# Patient Record
Sex: FEMALE | Race: BLACK OR AFRICAN AMERICAN | Employment: FULL TIME | URBAN - METROPOLITAN AREA
[De-identification: names, ages, dates, MRNs, and addresses within clinical notes are randomized per-mention and may not be internally consistent; named-entity substitution may affect disease eponyms.]

---

## 2022-02-22 ENCOUNTER — OFFICE VISIT (OUTPATIENT)
Dept: FAMILY MEDICINE CLINIC | Age: 32
End: 2022-02-22
Payer: MEDICAID

## 2022-02-22 VITALS
BODY MASS INDEX: 20.24 KG/M2 | DIASTOLIC BLOOD PRESSURE: 81 MMHG | HEIGHT: 62 IN | SYSTOLIC BLOOD PRESSURE: 110 MMHG | WEIGHT: 110 LBS | RESPIRATION RATE: 14 BRPM | HEART RATE: 78 BPM | TEMPERATURE: 98.1 F | OXYGEN SATURATION: 99 %

## 2022-02-22 DIAGNOSIS — R07.9 CHEST PAIN, UNSPECIFIED TYPE: ICD-10-CM

## 2022-02-22 DIAGNOSIS — Z76.89 ENCOUNTER TO ESTABLISH CARE: ICD-10-CM

## 2022-02-22 DIAGNOSIS — R55 SYNCOPE, UNSPECIFIED SYNCOPE TYPE: Primary | ICD-10-CM

## 2022-02-22 PROCEDURE — 99204 OFFICE O/P NEW MOD 45 MIN: CPT | Performed by: STUDENT IN AN ORGANIZED HEALTH CARE EDUCATION/TRAINING PROGRAM

## 2022-02-22 RX ORDER — SULFAMETHOXAZOLE AND TRIMETHOPRIM 800; 160 MG/1; MG/1
TABLET ORAL
COMMUNITY
Start: 2022-02-12 | End: 2022-02-23

## 2022-02-22 RX ORDER — LOSARTAN POTASSIUM 50 MG/1
TABLET ORAL
COMMUNITY
Start: 2021-11-29 | End: 2022-02-23

## 2022-02-22 NOTE — PROGRESS NOTES
Chief Complaint   Patient presents with   [facility] Establish Care     Visit Vitals  /81 (BP 1 Location: Left upper arm, BP Patient Position: Sitting)   Pulse 78   Temp 98.1 °F (36.7 °C) (Axillary)   Resp 14   Ht 5' 2\" (1.575 m)   Wt 110 lb (49.9 kg)   SpO2 99%   BMI 20.12 kg/m²     1. Have you been to the ER, urgent care clinic since your last visit? Hospitalized since your last visit? Yes When: 2/12/2022 [facility]    2. Have you seen or consulted any other health care providers outside of the [address] [place] [address] since your last visit? Include any pap smears or colon screening.  No

## 2022-02-22 NOTE — PROGRESS NOTES
Subjective:     Chief Complaint   Patient presents with    [facility]     HPI:  Evelyn Vences is a 36 y.o. female who presents to [facility] and discuss recent syncopal episode. Patient has had 2 syncopal episodes over the last year. Both times she went to the hospital and nothing was found on work-up. Most recent episode occurred about 2 weeks ago where she was at work. States vision turning black and passing out. Unsure how long. Denies any palpitations, SOB chest pain when the symptoms occurred. States she was walking it may have been hot at the time. She works at [employer] in [employer]. She has had multiple episodes of chest pain as well not associated with her syncopal episodes. Usually at night. She has history of heartburn as well but physic this pain is different. She was on losartan which she she was instructed to stop taking. Blood pressure today is well controlled. She was started on losartan at about 25years old and has lost 20 pounds since then. Denies any other significant medical history. There are no problems to display for this patient. History reviewed. No pertinent past medical history. History reviewed. No pertinent family history. reports that she has been smoking. She has been smoking about 0.25 packs per day. She has never used smokeless tobacco. She reports current alcohol use. She reports current drug use. Drug: Marijuana. Current Outpatient Medications on File Prior to Visit   Medication Sig Dispense Refill    losartan (COZAAR) 50 mg tablet TAKE 1 TABLET BY MOUTH IN THE MORNING (Patient not taking: Reported on 2/22/2022)      trimethoprim-sulfamethoxazole (BACTRIM DS, SEPTRA DS) 160-800 mg per tablet  (Patient not taking: Reported on 2/22/2022)       No current facility-administered medications on file prior to visit. No Known Allergies  Review of Systems   All other systems reviewed and are negative.       Objective:     Vitals: 02/22/22 0734   BP: 110/81   Pulse: 78   Resp: 14   Temp: 98.1 °F (36.7 °C)   TempSrc: Axillary   SpO2: 99%   Weight: 110 lb (49.9 kg)   Height: 5' 2\" (1.575 m)     Physical Exam  Vitals reviewed. Constitutional:       Appearance: Normal appearance. HENT:      Head: Normocephalic and atraumatic. Eyes:      Comments: Mild proptosis   Neck:      Thyroid: No thyromegaly. Cardiovascular:      Rate and Rhythm: Normal rate. Rhythm irregular. Heart sounds: Normal heart sounds. Pulmonary:      Effort: Pulmonary effort is normal.      Breath sounds: Normal breath sounds. Neurological:      General: No focal deficit present. Mental Status: She is alert and oriented to person, place, and time. Cranial Nerves: No cranial nerve deficit. Comments: 5/5 strength in upper and lower extremities   Psychiatric:         Behavior: Behavior normal.            Assessment/Plan:       ICD-10-CM ICD-9-CM    1. Syncope, unspecified syncope type  R55 780.2 TSH 3RD GENERATION      CBC WITH AUTOMATED DIFF      METABOLIC PANEL, COMPREHENSIVE      REFERRAL TO CARDIOLOGY   2. Chest pain, unspecified type  R07.9 786.50 REFERRAL TO CARDIOLOGY   3. Encounter to establish care  Z76.89 V65.8      Syncopeover the last year had to episodes of syncope. Work-up ventricles unremarkable. Suspect this is a vasovagal syncope in combination with hypertensive medication. Due to still having chest pain as well at different times during syncope I will have her see cardiology. Continue off losartan. Chest painsuspect this may be heartburn all due to episodes of syncope  Will have her see cardiology. Advised to try Pepcid. Encounter to establish carediscussed past medical history  Follow-up and Dispositions    · Return in about 3 months (around 5/22/2022) for Syncope.           Cornelius Shah MD

## 2022-02-23 LAB
ALBUMIN SERPL-MCNC: 4.1 G/DL (ref 3.9–5)
ALBUMIN/GLOB SERPL: 1.5 {RATIO} (ref 1.2–2.2)
ALP SERPL-CCNC: 49 IU/L (ref 44–121)
ALT SERPL-CCNC: 10 IU/L (ref 0–32)
AST SERPL-CCNC: 16 IU/L (ref 0–40)
BASOPHILS # BLD AUTO: 0 X10E3/UL (ref 0–0.2)
BASOPHILS NFR BLD AUTO: 1 %
BILIRUB SERPL-MCNC: <0.2 MG/DL (ref 0–1.2)
BUN SERPL-MCNC: 11 MG/DL (ref 6–20)
BUN/CREAT SERPL: 17 (ref 9–23)
CALCIUM SERPL-MCNC: 8.9 MG/DL (ref 8.7–10.2)
CHLORIDE SERPL-SCNC: 104 MMOL/L (ref 96–106)
CO2 SERPL-SCNC: 23 MMOL/L (ref 20–29)
CREAT SERPL-MCNC: 0.63 MG/DL (ref 0.57–1)
EOSINOPHIL # BLD AUTO: 0.2 X10E3/UL (ref 0–0.4)
EOSINOPHIL NFR BLD AUTO: 4 %
ERYTHROCYTE [DISTWIDTH] IN BLOOD BY AUTOMATED COUNT: 13.5 % (ref 11.7–15.4)
GLOBULIN SER CALC-MCNC: 2.7 G/DL (ref 1.5–4.5)
GLUCOSE SERPL-MCNC: 95 MG/DL (ref 65–99)
HCT VFR BLD AUTO: 37.9 % (ref 34–46.6)
HGB BLD-MCNC: 11.9 G/DL (ref 11.1–15.9)
IMM GRANULOCYTES # BLD AUTO: 0 X10E3/UL (ref 0–0.1)
IMM GRANULOCYTES NFR BLD AUTO: 0 %
LYMPHOCYTES # BLD AUTO: 1.3 X10E3/UL (ref 0.7–3.1)
LYMPHOCYTES NFR BLD AUTO: 34 %
MCH RBC QN AUTO: 27.9 PG (ref 26.6–33)
MCHC RBC AUTO-ENTMCNC: 31.4 G/DL (ref 31.5–35.7)
MCV RBC AUTO: 89 FL (ref 79–97)
MONOCYTES # BLD AUTO: 0.4 X10E3/UL (ref 0.1–0.9)
MONOCYTES NFR BLD AUTO: 10 %
NEUTROPHILS # BLD AUTO: 1.9 X10E3/UL (ref 1.4–7)
NEUTROPHILS NFR BLD AUTO: 51 %
PLATELET # BLD AUTO: 215 X10E3/UL (ref 150–450)
POTASSIUM SERPL-SCNC: 4.5 MMOL/L (ref 3.5–5.2)
PROT SERPL-MCNC: 6.8 G/DL (ref 6–8.5)
RBC # BLD AUTO: 4.26 X10E6/UL (ref 3.77–5.28)
SODIUM SERPL-SCNC: 138 MMOL/L (ref 134–144)
TSH SERPL DL<=0.005 MIU/L-ACNC: 1.52 UIU/ML (ref 0.45–4.5)
WBC # BLD AUTO: 3.8 X10E3/UL (ref 3.4–10.8)

## 2022-02-23 NOTE — PROGRESS NOTES
Please call patient and let her know her labs are essentially normal including normal liver, kidneys, electrolytes, thyroid. No anemia.

## 2022-04-13 ENCOUNTER — OFFICE VISIT (OUTPATIENT)
Dept: CARDIOLOGY CLINIC | Age: 32
End: 2022-04-13
Payer: MEDICAID

## 2022-04-13 VITALS
SYSTOLIC BLOOD PRESSURE: 122 MMHG | OXYGEN SATURATION: 99 % | DIASTOLIC BLOOD PRESSURE: 88 MMHG | HEART RATE: 86 BPM | BODY MASS INDEX: 20.24 KG/M2 | HEIGHT: 62 IN | WEIGHT: 110 LBS

## 2022-04-13 DIAGNOSIS — R55 SYNCOPE, UNSPECIFIED SYNCOPE TYPE: Primary | ICD-10-CM

## 2022-04-13 DIAGNOSIS — R07.9 CHEST PAIN AT REST: ICD-10-CM

## 2022-04-13 PROCEDURE — 99204 OFFICE O/P NEW MOD 45 MIN: CPT | Performed by: SPECIALIST

## 2022-04-13 PROCEDURE — 93000 ELECTROCARDIOGRAM COMPLETE: CPT | Performed by: SPECIALIST

## 2022-04-13 NOTE — PROGRESS NOTES
Callum Hernandez MD. [facility]              Patient: Laurence Broussard Younger  : 1990      Today's Date: 2022        HISTORY OF PRESENT ILLNESS:     History of Present Illness:  Referred for syncope and chest pain     Dr. Galvin Olszewski recently noted \"Patient has had 2 syncopal episodes over the last year. Both times she went to the hospital and nothing was found on work-up. Most recent episode occurred about 2 weeks ago where she was at work. States vision turning black and passing out. Unsure how long. Denies any palpitations, SOB chest pain when the symptoms occurred. States she was walking it may have been hot at the time. She works at [email or web address] in [employer]. She has had multiple episodes of chest pain as well not associated with her syncopal episodes. Usually at night. She has history of heartburn as well but physic this pain is different. She was on losartan which she she was instructed to stop taking. Blood pressure today is well controlled. She was started on losartan at about 25years old and has lost 20 pounds since then. \"    She says she passed out twice last year and once this year - went to [address] [place] [address] and was told it was low BP and losartan was stopped then (not admitted). She feels these spells coming on (gets lightheaded, vision goes black, etc) before spells. Has some chest tightness at night time (random, non-exertional). No heart racing. No CP related to syncope. Doing well off losartan. BP has been OK. PAST MEDICAL HISTORY:     Past Medical History:   Diagnosis Date    HTN (hypertension)     Syncope          MEDICATIONS:     On no meds       No Known Allergies          SOCIAL HISTORY:     Social History     Tobacco Use    Smoking status: Current Some Day Smoker     Packs/day: 0.25    Smokeless tobacco: Never Used   Substance Use Topics    Alcohol use:  Yes    Drug use: Yes     Types: Marijuana         FAMILY HISTORY:     Family History   Problem Relation Age of Onset  Stroke Mother              REVIEW OF SYMPTOMS:     Review of Symptoms:  Constitutional: Negative for fever, chills  HEENT: Negative for nosebleeds, tinnitus, and vision changes. Respiratory: Negative for cough, wheezing  Cardiovascular: Negative for orthopnea, claudication,  and PND. Gastrointestinal: Negative for abdominal pain, diarrhea, melena. Genitourinary: Negative for dysuria  Musculoskeletal: Negative for myalgias. Skin: Negative for rash  Heme: No problems bleeding. Neurological: Negative for speech change and focal weakness. PHYSICAL EXAM:     Physical Exam:  Visit Vitals  /82 (BP 1 Location: Left upper arm, BP Patient Position: Sitting, BP Cuff Size: Adult)   Pulse 83   Ht 5' 2\" (1.575 m)   Wt 110 lb (49.9 kg)   SpO2 99%   BMI 20.12 kg/m²     Patient appears generally well, mood and affect are appropriate and pleasant. HEENT:  Hearing intact, non-icteric, normocephalic, atraumatic. Neck Exam: Supple, No JVD or carotid bruits. Lung Exam: Clear to auscultation, even breath sounds. Cardiac Exam: Regular rate and rhythm with no murmur or rub  Abdomen: Soft, non-tender, normal bowel sounds. No bruits or masses. Extremities: Moves all ext well. No lower extremity edema. MSKTL: Overall good ROM ext  Skin: No significant rashes  Vascular: 2+ dorsalis pedis pulses bilaterally.   Psych: Appropriate affect  Neuro - Grossly intact      Vitals:    04/13/22 0934 04/13/22 1017 04/13/22 1018   BP: 136/82 122/60 122/88   BP 1 Location: Left upper arm Left upper arm Left upper arm   BP Patient Position: Sitting Supine Standing   BP Cuff Size: Adult Adult Adult   Pulse: 83 75 86   Height: 5' 2\" (1.575 m)     Weight: 110 lb (49.9 kg)     SpO2: 99%           LABS / OTHER STUDIES reviewed:     Lab Results   Component Value Date/Time    Sodium 138 02/22/2022 08:23 AM    Potassium 4.5 02/22/2022 08:23 AM    Chloride 104 02/22/2022 08:23 AM    CO2 23 02/22/2022 08:23 AM    Glucose 95 02/22/2022 08:23 AM    BUN 11 02/22/2022 08:23 AM    Creatinine 0.63 02/22/2022 08:23 AM    BUN/Creatinine ratio 17 02/22/2022 08:23 AM    GFR est  02/22/2022 08:23 AM    GFR est non- 02/22/2022 08:23 AM    Calcium 8.9 02/22/2022 08:23 AM    Bilirubin, total <0.2 02/22/2022 08:23 AM    Alk. phosphatase 49 02/22/2022 08:23 AM    Protein, total 6.8 02/22/2022 08:23 AM    Albumin 4.1 02/22/2022 08:23 AM    A-G Ratio 1.5 02/22/2022 08:23 AM    ALT (SGPT) 10 02/22/2022 08:23 AM    AST (SGOT) 16 02/22/2022 08:23 AM     Lab Results   Component Value Date/Time    WBC 3.8 02/22/2022 08:23 AM    HGB 11.9 02/22/2022 08:23 AM    HCT 37.9 02/22/2022 08:23 AM    PLATELET 618 76/26/7222 08:23 AM    MCV 89 02/22/2022 08:23 AM       Lab Results   Component Value Date/Time    TSH 1.520 02/22/2022 08:23 AM           CARDIAC DIAGNOSTICS:     Cardiac Evaluation Includes:  I reviewed the results below. EKG 4/13/22 - NSR, normal         ASSESSMENT AND PLAN:     Assessment and Plan:    1) Syncope   - had 3 syncopal spells [id number]  - symptoms likely vasovagal in nature and related to hypotension due to losartan (Of note, she was told this at last [facility] visit - will get those records to review)   - She has been doing fine since losartan was stopped   - She is not orthostatic on exam in the office   - Will check an echo to assess heart structure and function   - Will check a treadmill stress test given atypical CP  - Encouraged her to stay hydrated and lay down when she has warning signs of syncope     2) Atypical CP  - symptoms atypical for angina   - check a treadmill stress test     3) History of HTN  - BP OK off of losartan --- follow off of meds     4) Smoking cessation advised     5) Phone FU. See me as needed. She works at [employer]. Lives with aunt / uncle.           Marlyn Aguilar MD, [place] [address]  [address] [facility], [address]  [place] [address] [address], [address]  [place], [address] [place] [address]  Ph: [phone]   Ph [phone]      [facility]   4/19/2022  [facility] records reviewed - seen 2/7.22 for syncope. Labs, head CT, EKG normal - thought to have vasovagal syncope.  Had lowish BP and felt better after IV hydration   EKG 2/7/22 - NSR, normal       ADDENDUM   5/20/2022    Echo 5/19/22 - LVEF 66%, normal study     Treadmill Stress Test 5/19/22 - walked 10:40 (13.4 METS), normal study    Will send a message

## 2022-04-13 NOTE — PROGRESS NOTES
Orders for Echo and treadmill stress test when possible. See me as needed    per Dr. Gaby ELDER. Dx: syncope, cp    Req records from [facility] dx: syncope this year.     Vitals:    04/13/22 0934 04/13/22 1017 04/13/22 1018   BP: 136/82 122/60 122/88   BP 1 Location: Left upper arm Left upper arm Left upper arm   BP Patient Position: Sitting Supine Standing   BP Cuff Size: Adult Adult Adult   Pulse: 83 75 86   Height: 5' 2\" (1.575 m)     Weight: 110 lb (49.9 kg)     SpO2: 99%

## 2022-04-13 NOTE — PROGRESS NOTES
Juan Jaiems is a 36 y.o. female    Visit Vitals  /82 (BP 1 Location: Left upper arm, BP Patient Position: Sitting, BP Cuff Size: Adult)   Pulse 83   Ht 5' 2\" (1.575 m)   Wt 110 lb (49.9 kg)   SpO2 99%   BMI 20.12 kg/m²       Chief Complaint   Patient presents with    Chest Pain    Dizziness     SYNCOPE       Chest pain NO  SOB NO  Dizziness NO  Swelling NO  Recent hospital visit [facility], MARCH/22022, BRADYCARDIA  Refills NO  COVID VACCINE STATUS YES  HAD COVID?  NO

## 2022-05-19 ENCOUNTER — ANCILLARY PROCEDURE (OUTPATIENT)
Dept: CARDIOLOGY CLINIC | Age: 32
End: 2022-05-19
Payer: MEDICAID

## 2022-05-19 ENCOUNTER — ANCILLARY PROCEDURE (OUTPATIENT)
Dept: CARDIOLOGY CLINIC | Age: 32
End: 2022-05-19

## 2022-05-19 VITALS
HEIGHT: 62 IN | WEIGHT: 110 LBS | DIASTOLIC BLOOD PRESSURE: 78 MMHG | SYSTOLIC BLOOD PRESSURE: 124 MMHG | BODY MASS INDEX: 20.24 KG/M2

## 2022-05-19 VITALS — WEIGHT: 110 LBS | BODY MASS INDEX: 20.24 KG/M2 | HEIGHT: 62 IN

## 2022-05-19 DIAGNOSIS — R07.9 CHEST PAIN AT REST: ICD-10-CM

## 2022-05-19 DIAGNOSIS — R55 SYNCOPE, UNSPECIFIED SYNCOPE TYPE: ICD-10-CM

## 2022-05-19 LAB
ECHO AO ASC DIAM: 2.8 CM
ECHO AO ASCENDING AORTA INDEX: 1.89 CM/M2
ECHO AO ROOT DIAM: 2.8 CM
ECHO AO ROOT INDEX: 1.89 CM/M2
ECHO AV AREA PEAK VELOCITY: 2.3 CM2
ECHO AV AREA VTI: 2.3 CM2
ECHO AV AREA/BSA PEAK VELOCITY: 1.6 CM2/M2
ECHO AV AREA/BSA VTI: 1.6 CM2/M2
ECHO AV MEAN GRADIENT: 4 MMHG
ECHO AV MEAN VELOCITY: 0.9 M/S
ECHO AV PEAK GRADIENT: 6 MMHG
ECHO AV PEAK VELOCITY: 1.3 M/S
ECHO AV VELOCITY RATIO: 0.77
ECHO AV VTI: 24.2 CM
ECHO EST RA PRESSURE: 8 MMHG
ECHO LA DIAMETER INDEX: 1.69 CM/M2
ECHO LA DIAMETER: 2.5 CM
ECHO LA TO AORTIC ROOT RATIO: 0.89
ECHO LA VOL 2C: 36 ML (ref 22–52)
ECHO LA VOL 4C: 38 ML (ref 22–52)
ECHO LA VOLUME AREA LENGTH: 39 ML
ECHO LA VOLUME INDEX A2C: 24 ML/M2 (ref 16–34)
ECHO LA VOLUME INDEX A4C: 26 ML/M2 (ref 16–34)
ECHO LA VOLUME INDEX AREA LENGTH: 26 ML/M2 (ref 16–34)
ECHO LV E' LATERAL VELOCITY: 13 CM/S
ECHO LV E' SEPTAL VELOCITY: 9 CM/S
ECHO LV EDV A2C: 110 ML
ECHO LV EDV A4C: 103 ML
ECHO LV EDV BP: 107 ML (ref 56–104)
ECHO LV EDV INDEX A4C: 70 ML/M2
ECHO LV EDV INDEX BP: 72 ML/M2
ECHO LV EDV NDEX A2C: 74 ML/M2
ECHO LV EJECTION FRACTION A2C: 71 %
ECHO LV EJECTION FRACTION A4C: 59 %
ECHO LV EJECTION FRACTION BIPLANE: 66 % (ref 55–100)
ECHO LV ESV A2C: 32 ML
ECHO LV ESV A4C: 42 ML
ECHO LV ESV BP: 37 ML (ref 19–49)
ECHO LV ESV INDEX A2C: 22 ML/M2
ECHO LV ESV INDEX A4C: 28 ML/M2
ECHO LV ESV INDEX BP: 25 ML/M2
ECHO LV FRACTIONAL SHORTENING: 32 % (ref 28–44)
ECHO LV INTERNAL DIMENSION DIASTOLE INDEX: 2.97 CM/M2
ECHO LV INTERNAL DIMENSION DIASTOLIC: 4.4 CM (ref 3.9–5.3)
ECHO LV INTERNAL DIMENSION SYSTOLIC INDEX: 2.03 CM/M2
ECHO LV INTERNAL DIMENSION SYSTOLIC: 3 CM
ECHO LV IVSD: 0.6 CM (ref 0.6–0.9)
ECHO LV MASS 2D: 75.8 G (ref 67–162)
ECHO LV MASS INDEX 2D: 51.2 G/M2 (ref 43–95)
ECHO LV POSTERIOR WALL DIASTOLIC: 0.6 CM (ref 0.6–0.9)
ECHO LV RELATIVE WALL THICKNESS RATIO: 0.27
ECHO LVOT AREA: 2.8 CM2
ECHO LVOT AV VTI INDEX: 0.83
ECHO LVOT DIAM: 1.9 CM
ECHO LVOT MEAN GRADIENT: 2 MMHG
ECHO LVOT PEAK GRADIENT: 4 MMHG
ECHO LVOT PEAK VELOCITY: 1 M/S
ECHO LVOT STROKE VOLUME INDEX: 38.7 ML/M2
ECHO LVOT SV: 57.2 ML
ECHO LVOT VTI: 20.2 CM
ECHO MV A VELOCITY: 0.67 M/S
ECHO MV AREA PHT: 3.9 CM2
ECHO MV AREA VTI: 3 CM2
ECHO MV E DECELERATION TIME (DT): 193.5 MS
ECHO MV E VELOCITY: 0.77 M/S
ECHO MV E/A RATIO: 1.15
ECHO MV E/E' LATERAL: 5.92
ECHO MV E/E' RATIO (AVERAGED): 7.24
ECHO MV E/E' SEPTAL: 8.56
ECHO MV LVOT VTI INDEX: 0.95
ECHO MV MAX VELOCITY: 0.8 M/S
ECHO MV MEAN GRADIENT: 1 MMHG
ECHO MV MEAN VELOCITY: 0.5 M/S
ECHO MV PEAK GRADIENT: 2 MMHG
ECHO MV PRESSURE HALF TIME (PHT): 56.1 MS
ECHO MV VTI: 19.2 CM
ECHO RIGHT VENTRICULAR SYSTOLIC PRESSURE (RVSP): 24 MMHG
ECHO RV FREE WALL PEAK S': 10 CM/S
ECHO RV INTERNAL DIMENSION: 3.5 CM
ECHO RV TAPSE: 1.8 CM (ref 1.7–?)
ECHO TV REGURGITANT MAX VELOCITY: 2.01 M/S
ECHO TV REGURGITANT PEAK GRADIENT: 16 MMHG
STRESS ANGINA INDEX: 0
STRESS BASELINE DIAS BP: 62 MMHG
STRESS BASELINE HR: 72 BPM
STRESS BASELINE SYS BP: 118 MMHG
STRESS ESTIMATED WORKLOAD: 13.4 METS
STRESS EXERCISE DUR MIN: 10 MIN
STRESS EXERCISE DUR SEC: 40 SEC
STRESS O2 SAT PEAK: 99 %
STRESS O2 SAT REST: 97 %
STRESS PEAK DIAS BP: 82 MMHG
STRESS PEAK SYS BP: 174 MMHG
STRESS PERCENT HR ACHIEVED: 91 %
STRESS POST PEAK HR: 173 BPM
STRESS RATE PRESSURE PRODUCT: NORMAL BPM*MMHG
STRESS SR DUKE TREADMILL SCORE: 11
STRESS ST DEPRESSION: 0 MM
STRESS TARGET HR: 190 BPM

## 2022-05-19 PROCEDURE — 93306 TTE W/DOPPLER COMPLETE: CPT | Performed by: SPECIALIST

## 2022-05-19 PROCEDURE — 93015 CV STRESS TEST SUPVJ I&R: CPT | Performed by: SPECIALIST

## 2022-05-20 ENCOUNTER — TELEPHONE (OUTPATIENT)
Dept: CARDIOLOGY CLINIC | Age: 32
End: 2022-05-20

## 2022-05-23 ENCOUNTER — OFFICE VISIT (OUTPATIENT)
Dept: FAMILY MEDICINE CLINIC | Age: 32
End: 2022-05-23
Payer: MEDICAID

## 2022-05-23 VITALS
DIASTOLIC BLOOD PRESSURE: 93 MMHG | HEIGHT: 62 IN | BODY MASS INDEX: 19.14 KG/M2 | WEIGHT: 104 LBS | SYSTOLIC BLOOD PRESSURE: 132 MMHG | TEMPERATURE: 97.3 F | OXYGEN SATURATION: 99 % | RESPIRATION RATE: 14 BRPM | HEART RATE: 88 BPM

## 2022-05-23 DIAGNOSIS — I10 PRIMARY HYPERTENSION: Primary | ICD-10-CM

## 2022-05-23 DIAGNOSIS — R55 VASOVAGAL SYNCOPE: ICD-10-CM

## 2022-05-23 PROCEDURE — 99214 OFFICE O/P EST MOD 30 MIN: CPT | Performed by: STUDENT IN AN ORGANIZED HEALTH CARE EDUCATION/TRAINING PROGRAM

## 2022-05-23 RX ORDER — AMLODIPINE BESYLATE 5 MG/1
5 TABLET ORAL DAILY
Qty: 30 TABLET | Refills: 1 | Status: SHIPPED | OUTPATIENT
Start: 2022-05-23 | End: 2022-07-08 | Stop reason: SDUPTHER

## 2022-05-23 NOTE — PATIENT INSTRUCTIONS
Vasovagal Syncope: Care Instructions  Your Care Instructions     Vasovagal syncope (say \"zlz-iwz-WYCKavin SCOTT-kuh-pee\")is sudden dizziness or fainting that can be set off by things such as pain, stress, fear, or trauma. You may sweat or feel lightheaded, sick to your stomach, or tingly. The problem causes the heart rate to slow and the blood vessels to widen, or dilate, for a short time. When this happens, blood pools in the lower body, and less blood goes to the brain. You can usually get relief by lying down with your legs raised (elevated). This helps more blood to flow to your brain and may help relieve symptoms like feeling dizzy. Some doctors may recommend a technique that involves tensing your fists and arms. This type of fainting is often easy to predict. For example, it happens to some people when they see blood or have to get a shot. They may feel symptoms before they faint. An episode of vasovagal syncope usually responds well to self-care. Other treatment often isn't needed. But if the fainting keeps happening, your doctor may suggest further treatments. Follow-up care is a key part of your treatment and safety. Be sure to make and go to all appointments, and call your doctor if you are having problems. It's also a good idea to know your test results and keep a list of the medicines you take. How can you care for yourself at home? · Drink plenty of fluids to prevent dehydration. If you have kidney, heart, or liver disease and have to limit fluids, talk with your doctor before you increase your fluid intake. · Try to avoid things that you think may set off vasovagal syncope. · Talk to your doctor about any medicines you take. Some medicines may increase the chance of this condition occurring. · If you feel symptoms, lie down with your legs raised. Talk to your doctor about what to do if your symptoms come back. When should you call for help?    Call 911 anytime you think you may need emergency care. For example, call if:    · You have symptoms of a heart problem. These may include:  ? Chest pain or pressure. ? Severe trouble breathing. ? A fast or irregular heartbeat. Watch closely for changes in your health, and be sure to contact your doctor if:    · You have more episodes of fainting at home.     · You do not get better as expected. Where can you learn more? Go to http://www.gray.com/  Enter L754 in the search box to learn more about \"Vasovagal Syncope: Care Instructions. \"  Current as of: July 1, 2021               Content Version: 13.2  © 2006-2022 [employer]. Care instructions adapted under license by [employer] (which disclaims liability or warranty for this information). If you have questions about a medical condition or this instruction, always ask your healthcare professional. [address] any warranty or liability for your use of this information. Amlodipine (By mouth)   Amlodipine (di-FCV-qf-peen)  Treats high blood pressure and angina (chest pain). This medicine is a calcium channel blocker. Brand Name(s): Norvasc   There may be other brand names for this medicine. When This Medicine Should Not Be Used: This medicine is not right for everyone. Do not use it if you had an allergic reaction to amlodipine. How to Use This Medicine:   Tablet, Dissolving Tablet  · Take your medicine as directed. Your dose may need to be changed several times to find what works best for you. Take this medicine at the same time each day. · Read and follow the patient instructions that come with this medicine. Talk to your doctor or pharmacist if you have any questions. · Missed dose: Take a dose as soon as you remember. If it has been more than 12 hours since you were supposed to take your dose, skip the missed dose and take your next regular dose at the regular time.   · Store the medicine in a closed container at room temperature, away from heat, moisture, and direct light. Drugs and Foods to Avoid:   Ask your doctor or pharmacist before using any other medicine, including over-the-counter medicines, vitamins, and herbal products. · Some medicines can affect how amlodipine works. Tell your doctor if you are also using any of the following:   ¨ Clarithromycin, cyclosporine, diltiazem, itraconazole, ritonavir, sildenafil, simvastatin, tacrolimus  Warnings While Using This Medicine:   · Tell your doctor if you are pregnant or breastfeeding, or if you have liver disease, heart disease, coronary artery disease, or aortic stenosis. · This medicine could lower your blood pressure too much, especially when you first use it or if you are dehydrated. Stand or sit up slowly if you feel lightheaded or dizzy. · Your doctor will check your progress and the effects of this medicine at regular visits. Keep all appointments. · Do not stop using this medicine without asking your doctor, even if you feel well. This medicine will not cure high blood pressure, but it will help keep it in normal range. You may have to take blood pressure medicine for the rest of your life. · Keep all medicine out of the reach of children. Never share your medicine with anyone. Possible Side Effects While Using This Medicine:   Call your doctor right away if you notice any of these side effects:  · Allergic reaction: Itching or hives, swelling in your face or hands, swelling or tingling in your mouth or throat, chest tightness, trouble breathing  · Lightheadedness, dizziness  · New or worsening chest pain  · Swelling in your hands, ankles, or legs  · Trouble breathing, nausea, unusual sweating, fainting  If you notice other side effects that you think are caused by this medicine, tell your doctor. Call your doctor for medical advice about side effects.  You may report side effects to FDA at 2-328-FDA-2555  © 2017 [facility] Information is for End User's use only and may not be sold, redistributed or otherwise used for commercial purposes. The above information is an  only. It is not intended as medical advice for individual conditions or treatments. Talk to your doctor, nurse or pharmacist before following any medical regimen to see if it is safe and effective for you.

## 2022-05-23 NOTE — PROGRESS NOTES
Subjective:     Chief Complaint   Patient presents with    Follow-up     dizziness     HPI:  Brenda Lunsford is a 29 y.o. female who presents for follow-up of dizziness and syncope. Patient has had about 3 syncopal episodes in the last couple months. She was seen in a year and it was thought to have been due to vasovagal syncope and losartan use. Cardiology was consulted and cardiac studies were negative including normal stress test and echo. Since stopping losartan her symptoms have improved with just occasional dizziness, and no syncopal episodes. Her blood pressure has been elevated at home with systolic blood pressures in the 174T and diastolic blood pressures up to the 111. She checks her blood pressure every day and is usually elevated. His family history of hypertension as well. There are no problems to display for this patient. Past Medical History:   Diagnosis Date    HTN (hypertension)     Syncope      Family History   Problem Relation Age of Onset    Stroke Mother       reports that she has been smoking. She has been smoking about 0.25 packs per day. She has never used smokeless tobacco. She reports current alcohol use. She reports current drug use. Drug: Marijuana. No current outpatient medications on file prior to visit. No current facility-administered medications on file prior to visit. No Known Allergies  Review of Systems   All other systems reviewed and are negative. Objective:     Vitals:    05/23/22 0750   BP: (!) 132/93   Pulse: 88   Resp: 14   Temp: 97.3 °F (36.3 °C)   TempSrc: Axillary   SpO2: 99%   Weight: 104 lb (47.2 kg)   Height: 5' 2\" (1.575 m)     Physical Exam  Vitals reviewed. Constitutional:       Appearance: Normal appearance. HENT:      Head: Normocephalic and atraumatic. Cardiovascular:      Rate and Rhythm: Normal rate and regular rhythm. Heart sounds: Normal heart sounds.    Pulmonary:      Effort: Pulmonary effort is normal. Breath sounds: Normal breath sounds. Neurological:      Mental Status: She is alert and oriented to person, place, and time. Psychiatric:         Behavior: Behavior normal.            Assessment/Plan:       ICD-10-CM ICD-9-CM    1. Primary hypertension  I10 401.9 amLODIPine (NORVASC) 5 mg tablet   2. Vasovagal syncope  R55 780.2      Hypertension BP elevated  after being off of losartan. Due to worsening of vasovagal syncope, will try amlodipine. Amlodipine ordered follow-up in 6 weeks. Discussed possible side effects of amlodipine and to call if develops any. Vasovagal syncope as syncopal episodes are most likely vasovagal, possibly secondary to heat. Cardiac work-up was negative. Educated patient on conservative measures and provided her with documentation. Follow-up and Dispositions    · Return in about 6 weeks (around 7/4/2022) for HTN.             Lili Clark MD

## 2022-05-23 NOTE — PROGRESS NOTES
Chief Complaint   Patient presents with    Follow-up     Visit Vitals  BP (!) 132/93 (BP 1 Location: Left upper arm, BP Patient Position: Sitting)   Pulse 88   Temp 97.3 °F (36.3 °C) (Axillary)   Resp 14   Ht 5' 2\" (1.575 m)   Wt 104 lb (47.2 kg)   SpO2 99%   BMI 19.02 kg/m²     1. Have you been to the ER, urgent care clinic since your last visit? Hospitalized since your last visit? No    2. Have you seen or consulted any other health care providers outside of the [address] [place] [address] since your last visit? Include any pap smears or colon screening.  No

## 2022-05-24 NOTE — TELEPHONE ENCOUNTER
Spoke to pt,  Per Dr. Floydene Harada: [facility]   5/20/2022     Echo 5/19/22 - LVEF 66%, normal study      Treadmill Stress Test 5/19/22 - walked 10:40 (13.4 METS), normal study     Will send a message\"    Confirmed NOV is only PRN.

## 2022-07-07 ENCOUNTER — OFFICE VISIT (OUTPATIENT)
Dept: FAMILY MEDICINE CLINIC | Age: 32
End: 2022-07-07
Payer: MEDICAID

## 2022-07-07 VITALS
RESPIRATION RATE: 14 BRPM | OXYGEN SATURATION: 99 % | SYSTOLIC BLOOD PRESSURE: 105 MMHG | HEIGHT: 62 IN | BODY MASS INDEX: 18.95 KG/M2 | WEIGHT: 103 LBS | DIASTOLIC BLOOD PRESSURE: 84 MMHG | TEMPERATURE: 97.3 F | HEART RATE: 68 BPM

## 2022-07-07 DIAGNOSIS — I10 PRIMARY HYPERTENSION: Primary | ICD-10-CM

## 2022-07-07 DIAGNOSIS — R55 VASOVAGAL SYNCOPE: ICD-10-CM

## 2022-07-07 PROCEDURE — 99213 OFFICE O/P EST LOW 20 MIN: CPT | Performed by: STUDENT IN AN ORGANIZED HEALTH CARE EDUCATION/TRAINING PROGRAM

## 2022-07-07 NOTE — PROGRESS NOTES
Chief Complaint   Patient presents with    Hypertension     Visit Vitals  /84 (BP 1 Location: Left upper arm, BP Patient Position: Sitting)   Pulse 68   Temp 97.3 °F (36.3 °C) (Axillary)   Resp 14   Ht 5' 2\" (1.575 m)   Wt 103 lb (46.7 kg)   SpO2 99%   BMI 18.84 kg/m²     1. Have you been to the ER, urgent care clinic since your last visit? Hospitalized since your last visit? No    2. Have you seen or consulted any other health care providers outside of the [address] [place] [address] since your last visit? Include any pap smears or colon screening.  No

## 2022-07-07 NOTE — PROGRESS NOTES
Subjective:     Chief Complaint   Patient presents with    Hypertension     HPI:  Jatinder Haas is a 29 y.o. adult presents for follow-up of hypertension. Last visit patient had complained of elevated blood pressure at home after having her ARB stopped due to syncopal episodes. She did see cardiology and when she was diagnosed with vasovagal syncope, and cardiac causes were dismissed. She was started amlodipine for blood pressure. Her blood pressures been well controlled, with some the results of today in the office, as well as she denies any more syncopal or presyncopal episodes. Patient Active Problem List    Diagnosis    Primary hypertension    Vasovagal syncope     Past Medical History:   Diagnosis Date    HTN (hypertension)     Syncope      Family History   Problem Relation Age of Onset    Stroke Mother       reports that [address] \"Donn\" has been smoking. [address] Alvira Urvashi" has been smoking about 0.25 packs per day. Jatinder Haas \"Donn\" has never used smokeless tobacco. [address] \"Donn\" reports current alcohol use. [address] Alvira Urvashi" reports current drug use. Drug: Marijuana. Current Outpatient Medications on File Prior to Visit   Medication Sig Dispense Refill    amLODIPine (NORVASC) 5 mg tablet Take 1 Tablet by mouth daily. 30 Tablet 1     No current facility-administered medications on file prior to visit. No Known Allergies  Review of Systems   All other systems reviewed and are negative. Objective:     Vitals:    07/07/22 0913   BP: 105/84   Pulse: 68   Resp: 14   Temp: 97.3 °F (36.3 °C)   TempSrc: Axillary   SpO2: 99%   Weight: 103 lb (46.7 kg)   Height: 5' 2\" (1.575 m)     Physical Exam  Vitals reviewed. HENT:      Head: Normocephalic and atraumatic. Cardiovascular:      Rate and Rhythm: Normal rate and regular rhythm. Heart sounds: Normal heart sounds.    Pulmonary:      Effort: Pulmonary effort is normal.      Breath sounds: Normal breath sounds. Neurological:      Mental Status: Vicie Champion Jannette Goltz" is oriented to person, place, and time. Psychiatric:         Behavior: Behavior normal.            Assessment/Plan:       ICD-10-CM ICD-9-CM    1. Primary hypertension  I10 401.9    2. Vasovagal syncope  R55 780.2      Blood pressure well controlled now on amlodipine, and has not had any more vasovagal syncope since stopping ARB. Continue current management for now. Advised to monitor blood pressure closely as it is in the low normal range. If it continues to decrease I will decrease amlodipine to 2.5 mg daily. Follow-up and Dispositions    · Return in about 6 months (around 1/7/2023) for HTN.           Natan Jacques MD

## 2023-03-03 ENCOUNTER — OFFICE VISIT (OUTPATIENT)
Dept: FAMILY MEDICINE CLINIC | Age: 33
End: 2023-03-03
Payer: MEDICAID

## 2023-03-03 VITALS
DIASTOLIC BLOOD PRESSURE: 62 MMHG | BODY MASS INDEX: 18.95 KG/M2 | HEIGHT: 62 IN | SYSTOLIC BLOOD PRESSURE: 110 MMHG | HEART RATE: 100 BPM | OXYGEN SATURATION: 100 % | TEMPERATURE: 97.3 F | WEIGHT: 103 LBS

## 2023-03-03 DIAGNOSIS — I10 PRIMARY HYPERTENSION: Primary | ICD-10-CM

## 2023-03-03 PROCEDURE — 3074F SYST BP LT 130 MM HG: CPT | Performed by: STUDENT IN AN ORGANIZED HEALTH CARE EDUCATION/TRAINING PROGRAM

## 2023-03-03 PROCEDURE — 3078F DIAST BP <80 MM HG: CPT | Performed by: STUDENT IN AN ORGANIZED HEALTH CARE EDUCATION/TRAINING PROGRAM

## 2023-03-03 PROCEDURE — 99213 OFFICE O/P EST LOW 20 MIN: CPT | Performed by: STUDENT IN AN ORGANIZED HEALTH CARE EDUCATION/TRAINING PROGRAM

## 2023-03-03 NOTE — PROGRESS NOTES
Chief Complaint   Patient presents with    Follow Up Chronic Condition     1. \"Have you been to the ER, urgent care clinic since your last visit? Hospitalized since your last visit? \" No    2. \"Have you seen or consulted any other health care providers outside of the [address] [place] [address] since your last visit? \" No     3. For patients aged 39-70: Has the patient had a colonoscopy / FIT/ Cologuard? NA - based on age      If the patient is female:    4. For patients aged 41-77: Has the patient had a mammogram within the past 2 years? NA - based on age or sex      11. For patients aged 21-65: Has the patient had a pap smear?  No    3 most recent PHQ Screens 3/3/2023   Little interest or pleasure in doing things Not at all   Feeling down, depressed, irritable, or hopeless Not at all   Total Score PHQ 2 0   Trouble falling or staying asleep, or sleeping too much -   Feeling tired or having little energy -   Poor appetite, weight loss, or overeating -   Feeling bad about yourself - or that you are a failure or have let yourself or your family down -   Trouble concentrating on things such as school, work, reading, or watching TV -   Moving or speaking so slowly that other people could have noticed; or the opposite being so fidgety that others notice -   Thoughts of being better off dead, or hurting yourself in some way -   PHQ 9 Score -   How difficult have these problems made it for you to do your work, take care of your home and get along with others -

## 2023-03-03 NOTE — PROGRESS NOTES
Subjective:     Chief Complaint   Patient presents with    Follow Up Chronic Condition     HPI:  Adama Zavaleta is a 29 y.o. adult who is here for follow-up of hypertension. Blood pressure well controlled amlodipine. Had been switched from ARB stopped due to syncopal episodes. She did see cardiology and when she was diagnosed with vasovagal syncope, and cardiac causes were dismissed. She was started amlodipine for blood pressure. Her blood pressures been well controlled, with some the results of today in the office, as well as she denies any more syncopal or presyncopal episodes. Has never had Pap smear done before. Overdue for pap smear. Patient Active Problem List    Diagnosis    Primary hypertension    Vasovagal syncope     Past Medical History:   Diagnosis Date    HTN (hypertension)     Syncope      Family History   Problem Relation Age of Onset    Stroke Mother       reports that [address] Ady Perezs" has been smoking cigarettes. [address] Younger \"Donn\" has a 0.50 pack-year smoking history. Adama Zavaleta \"Donn\" has never used smokeless tobacco. [address] \"Donn\" reports current alcohol use. [address] Rollins Balta" reports current drug use. Drug: Marijuana. Current Outpatient Medications on File Prior to Visit   Medication Sig Dispense Refill    amLODIPine (NORVASC) 5 mg tablet Take 1 Tablet by mouth daily. 90 Tablet 1     No current facility-administered medications on file prior to visit. No Known Allergies  Review of Systems   All other systems reviewed and are negative. Objective:     Vitals:    03/03/23 1002   BP: 110/62   Pulse: 100   Temp: 97.3 °F (36.3 °C)   TempSrc: Temporal   SpO2: 100%   Weight: 103 lb (46.7 kg)   Height: 5' 2\" (1.575 m)     Physical Exam  Vitals reviewed. Constitutional:       Appearance: Normal appearance. HENT:      Head: Normocephalic and atraumatic. Cardiovascular:      Rate and Rhythm: Normal rate.    Pulmonary:      Effort: Pulmonary effort is normal.   Neurological:      Mental Status: Whit Decker \"Donn\" is alert and oriented to person, place, and time. Psychiatric:         Behavior: Behavior normal.          Assessment/Plan:       Diagnoses and all orders for this visit:    1. Primary hypertension    Well controlled no side effects a little bit. Continue current management. Overdue for Pap smear. We will do Pap smear next visit. Follow-up and Dispositions    Return in about 4 months (around 7/3/2023) for Pap Smear.           Karen Parker MD

## 2023-06-28 RX ORDER — AMLODIPINE BESYLATE 5 MG/1
TABLET ORAL
Qty: 90 TABLET | Refills: 1 | Status: SHIPPED | OUTPATIENT
Start: 2023-06-28